# Patient Record
Sex: FEMALE | Race: OTHER | HISPANIC OR LATINO | URBAN - METROPOLITAN AREA
[De-identification: names, ages, dates, MRNs, and addresses within clinical notes are randomized per-mention and may not be internally consistent; named-entity substitution may affect disease eponyms.]

---

## 2020-08-20 ENCOUNTER — EMERGENCY (EMERGENCY)
Facility: HOSPITAL | Age: 55
LOS: 0 days | Discharge: HOME | End: 2020-08-20
Attending: EMERGENCY MEDICINE | Admitting: EMERGENCY MEDICINE
Payer: COMMERCIAL

## 2020-08-20 VITALS
HEART RATE: 104 BPM | RESPIRATION RATE: 16 BRPM | SYSTOLIC BLOOD PRESSURE: 116 MMHG | TEMPERATURE: 98 F | DIASTOLIC BLOOD PRESSURE: 69 MMHG | OXYGEN SATURATION: 95 % | WEIGHT: 154.98 LBS

## 2020-08-20 VITALS — HEART RATE: 97 BPM
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
## 2020-08-20 DIAGNOSIS — M79.601 PAIN IN RIGHT ARM: ICD-10-CM
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
## 2020-08-20 DIAGNOSIS — S50.11XA CONTUSION OF RIGHT FOREARM, INITIAL ENCOUNTER: ICD-10-CM

## 2020-08-20 DIAGNOSIS — M25.561 PAIN IN RIGHT KNEE: ICD-10-CM

## 2020-08-20 DIAGNOSIS — Z23 ENCOUNTER FOR IMMUNIZATION: ICD-10-CM

## 2020-08-20 DIAGNOSIS — S60.211A CONTUSION OF RIGHT WRIST, INITIAL ENCOUNTER: ICD-10-CM

## 2020-08-20 PROCEDURE — 73060 X-RAY EXAM OF HUMERUS: CPT | Mod: 26,RT

## 2020-08-20 PROCEDURE — 73080 X-RAY EXAM OF ELBOW: CPT | Mod: 26,RT

## 2020-08-20 PROCEDURE — 99284 EMERGENCY DEPT VISIT MOD MDM: CPT

## 2020-08-20 PROCEDURE — 73562 X-RAY EXAM OF KNEE 3: CPT | Mod: 26,RT

## 2020-08-20 PROCEDURE — 73110 X-RAY EXAM OF WRIST: CPT | Mod: 26,RT

## 2020-08-20 PROCEDURE — 73030 X-RAY EXAM OF SHOULDER: CPT | Mod: 26,RT

## 2020-08-20 PROCEDURE — 73090 X-RAY EXAM OF FOREARM: CPT | Mod: 26,RT

## 2020-08-20 RX ORDER — TETANUS TOXOID, REDUCED DIPHTHERIA TOXOID AND ACELLULAR PERTUSSIS VACCINE, ADSORBED 5; 2.5; 8; 8; 2.5 [IU]/.5ML; [IU]/.5ML; UG/.5ML; UG/.5ML; UG/.5ML
0.5 SUSPENSION INTRAMUSCULAR ONCE
Refills: 0 | Status: COMPLETED | OUTPATIENT
Start: 2020-08-20 | End: 2020-08-20

## 2020-08-20 RX ORDER — IBUPROFEN 200 MG
600 TABLET ORAL ONCE
Refills: 0 | Status: COMPLETED | OUTPATIENT
Start: 2020-08-20 | End: 2020-08-20

## 2020-08-20 RX ORDER — METHOCARBAMOL 500 MG/1
1000 TABLET, FILM COATED ORAL ONCE
Refills: 0 | Status: COMPLETED | OUTPATIENT
Start: 2020-08-20 | End: 2020-08-20

## 2020-08-20 RX ADMIN — Medication 600 MILLIGRAM(S): at 23:35

## 2020-08-20 RX ADMIN — METHOCARBAMOL 1000 MILLIGRAM(S): 500 TABLET, FILM COATED ORAL at 23:46
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
## 2020-08-20 NOTE — ED PROVIDER NOTE - CARE PROVIDER_API CALL
Eric Wiley  ORTHOPAEDIC SURGERY  [address] [place] [address]  Phone: [phone]  Fax: [phone]  Follow Up Time: 1-3 Days
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
## 2020-08-20 NOTE — ED PROVIDER NOTE - PATIENT PORTAL LINK FT
You can access the FollowMyHealth Patient Portal offered by [facility] by registering at the following website: [email or web address]. By joining [employer]’s FollowMyHealth portal, you will also be able to view your health information using other applications (apps) compatible with our system.
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
## 2020-08-21 RX ADMIN — Medication 600 MILLIGRAM(S): at 00:08

## 2020-08-21 RX ADMIN — TETANUS TOXOID, REDUCED DIPHTHERIA TOXOID AND ACELLULAR PERTUSSIS VACCINE, ADSORBED 0.5 MILLILITER(S): 5; 2.5; 8; 8; 2.5 SUSPENSION INTRAMUSCULAR at 00:05

## 2020-08-21 NOTE — ED ADULT NURSE NOTE - CHPI ED NUR SYMPTOMS NEG
no crying/no loss of consciousness/no fussiness/no disorientation/no bruising/no dizziness/no headache/no laceration/no decreased eating/drinking/no difficulty bearing weight
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
## 2023-07-27 ENCOUNTER — EMERGENCY (EMERGENCY)
Facility: HOSPITAL | Age: 58
LOS: 0 days | Discharge: HOME | End: 2023-07-27
Attending: EMERGENCY MEDICINE
Payer: COMMERCIAL

## 2023-07-27 VITALS
SYSTOLIC BLOOD PRESSURE: 144 MMHG | OXYGEN SATURATION: 98 % | TEMPERATURE: 98 F | HEART RATE: 76 BPM | DIASTOLIC BLOOD PRESSURE: 70 MMHG | WEIGHT: 119.93 LBS | RESPIRATION RATE: 18 BRPM | HEIGHT: 59 IN

## 2023-07-27 DIAGNOSIS — K04.7 PERIAPICAL ABSCESS WITHOUT SINUS: ICD-10-CM

## 2023-07-27 DIAGNOSIS — K08.89 OTHER SPECIFIED DISORDERS OF TEETH AND SUPPORTING STRUCTURES: ICD-10-CM

## 2023-07-27 PROCEDURE — 99283 EMERGENCY DEPT VISIT LOW MDM: CPT | Mod: 25

## 2023-07-27 PROCEDURE — 64400 NJX AA&/STRD TRIGEMINAL NRV: CPT

## 2023-07-27 PROCEDURE — 64400 NJX AA&/STRD TRIGEMINAL NRV: CPT | Mod: RT

## 2023-07-27 PROCEDURE — 99282 EMERGENCY DEPT VISIT SF MDM: CPT | Mod: 25

## 2023-07-27 NOTE — ED PROCEDURE NOTE - CPROC ED ANATOMIC LOCATION1
posterolateral portion of the maxillary tuberosity and 2nd molar anterior border of mandible ramus, intraoral coronoid notch, and occlusal plane of molars

## 2023-07-27 NOTE — ED PROVIDER NOTE - ATTENDING CONTRIBUTION TO CARE
Presenting with right molar pain and associated abscess, has been prescribed first antibiotic, with second antibiotic added, Nontoxic appearance,  No submandibular or floor or mouth swelling, voice change, odynophagia or drooling, fever, chills, or inhaled tooth fragment.   A/P: Likely caries and dental infection. and inferior alveolar block performed.  Referred to oral surgeon.

## 2023-07-27 NOTE — ED PROVIDER NOTE - NSFOLLOWUPCLINICS_GEN_ALL_ED_FT
[facility]  Dental  [address] [place] [id number]  Phone: [phone]  Fax:

## 2023-07-27 NOTE — ED PROVIDER NOTE - OBJECTIVE STATEMENT
57-year-old female no past medical history complains of right dental pain.  Patient noticed pain 5 days ago was prescribed Augmentin by her daughter has been compliant with medications for 4 days patient went to go see her dentist who prescribed clindamycin.  Patient was then advised to go see a an oral surgeon.  Patient presents today for increased pain tolerating p.o. no difficulty breathing no other complaints denies constitutional symptoms 57-year-old female no past medical history complains of right dental pain.  Patient noticed pain 5 days ago was prescribed Augmentin by her daughter has been compliant with medications for 4 days patient went to go see her dentist who prescribed clindamycin.  Patient was then advised to go see a an oral surgeon.  Patient presents today for increased pain tolerating p.o. no difficulty breathing no other complaints denies constitutional symptoms     - history corroborated by her Son at bedside.

## 2023-07-27 NOTE — ED ADULT TRIAGE NOTE - PATIENT ON (OXYGEN DELIVERY METHOD)
SUBJECTIVE: Anna Smith is a 55 year old female presenting with a chief complaint of sore throat and sob.  Onset of symptoms was day(s) ago.  Course of illness is worsening.    Severity moderate  Current and Associated symptoms: stuffy nose and cough - non-productive  Treatment measures tried include Tylenol/Ibuprofen.  Predisposing factors include None.    No past medical history on file.    No past surgical history on file.    No family history on file.    Social History   Substance Use Topics     Smoking status: Never Smoker     Smokeless tobacco: Never Used     Alcohol use No     ROS:  SKIN: no rash  GI: no vomiting    OBJECTIVE:  /88  Pulse 82  Temp 99.6  F (37.6  C) (Oral)  Resp 16  SpO2 94%GENERAL APPEARANCE: healthy, alert and no distress  EYES: EOMI,  PERRL, conjunctiva clear  HENT: ear canals and TM's normal.  Nose and mouth without ulcers, erythema or lesions  NECK: supple, nontender, no lymphadenopathy  RESP: lungs clear to auscultation - no rales, rhonchi or wheezes  CV: regular rates and rhythm, normal S1 S2, no murmur noted  SKIN: no suspicious lesions or rashes      ICD-10-CM    1. SOB (shortness of breath) R06.02 albuterol (2.5 MG/3ML) 0.083% neb solution     INHALATION/NEBULIZER TREATMENT, INITIAL     albuterol (PROAIR HFA) 108 (90 Base) MCG/ACT inhaler     ALBUTEROL UNIT DOSE, 1 MG   2. Throat pain R07.0 Strep, Rapid Screen     Beta strep group A culture     ALBUTEROL UNIT DOSE, 1 MG     Fluids/Rest, f/u if worse/not any better     room air

## 2023-07-27 NOTE — ED PROVIDER NOTE - PHYSICAL EXAMINATION
CONSTITUTIONAL: nontoxic appearing, in no acute distress  HEAD:  normocephalic, atraumatic  EYES:  no conjunctival injection, no eye discharge, tracking well  ENT: mmm; L inferior dental absesss  NECK:  supple, no masses, no tender anterior/posterior cervical lymphadenopathy  CV:  regular rate and rhythm, cap refill < 2 seconds  RESP:  normal respiratory effort  ABD:  soft, nontender, nondistended, no masses, no organomegaly  LYMPH:  no significant lymphadenopathy  MSK/NEURO:  normal movement, normal tone  SKIN:  warm, dry, no rash

## 2023-07-27 NOTE — ED PROVIDER NOTE - CLINICAL SUMMARY MEDICAL DECISION MAKING FREE TEXT BOX
Likely caries and dental infection. and inferior alveolar block performed.  Referred to oral surgeon.

## 2023-07-27 NOTE — ED PROCEDURE NOTE - CPROC ED POST PROC CARE GUIDE1
Verbal/written post procedure instructions were given to patient/caregiver. Verbal/written post procedure instructions were given to patient/caregiver./Instructed patient/caregiver regarding signs and symptoms of infection./Instructed patient/caregiver to follow-up with primary dentist./Avoid solid food./Avoid hot food.

## 2023-07-27 NOTE — ED PROVIDER NOTE - NSFOLLOWUPINSTRUCTIONS_ED_ALL_ED_FT
Continue to take your augmentin and clindamycin until the course is over.     DISCHARGE INSTRUCTIONS  - Please follow up with your doctor in 1-3 days  - Return to ED if you notice worsening vomiting, develop diarrhea, develop fevers, signs of respiratory distress, decreased oral intake, decreased wet diapers or any other concerning symptoms    Dental Pain    Dental pain (toothache) may be caused by many things including tooth decay (cavities or caries), abscess or infection, or trauma. If you were prescribed an antibiotic medicine, finish all of it even if you start to feel better. Rinsing your mouth with salt water or applying ice to the painful area of your face may help with the pain. Follow up with a dentist is important in ensuring good oral health and preventing the worsening of dental disease.    SEEK IMMEDIATE MEDICAL CARE IF YOU HAVE ANY OF THE FOLLOWING SYMPTOMS: unable to open your mouth, trouble breathing or swallowing, fever, or swelling of the face, neck, or jaw.    Abscess    An abscess is an infected area that contains a collection of pus and debris. It can occur in almost any part of the body and occurs when the tissue gets infection. Symptoms include a painful mass that is red, warm, tender that might break open and HAVE drainage. If your health care provider gave you antibiotics make sure to take the full course and do not stop even if feeling better.     SEEK IMMEDIATE MEDICAL CARE IF YOU HAVE ANY OF THE FOLLOWING SYMPTOMS: chills, fever, muscle aches, or red streaking from the area.

## 2023-07-27 NOTE — ED PROVIDER NOTE - PATIENT PORTAL LINK FT
You can access the FollowMyHealth Patient Portal offered by [facility] by registering at the following website: [email or web address]. By joining [employer]’s FollowMyHealth portal, you will also be able to view your health information using other applications (apps) compatible with our system.

## 2024-09-12 ENCOUNTER — NON-APPOINTMENT (OUTPATIENT)
Age: 59
End: 2024-09-12

## 2024-09-13 ENCOUNTER — APPOINTMENT (OUTPATIENT)
Dept: ORTHOPEDIC SURGERY | Facility: CLINIC | Age: 59
End: 2024-09-13
Payer: COMMERCIAL

## 2024-09-13 VITALS — WEIGHT: 110 LBS | HEIGHT: 58 IN | BODY MASS INDEX: 23.09 KG/M2

## 2024-09-13 DIAGNOSIS — S92.501A DISPLACED UNSPECIFIED FRACTURE OF RIGHT LESSER TOE(S), INITIAL ENCOUNTER FOR CLOSED FRACTURE: ICD-10-CM

## 2024-09-13 DIAGNOSIS — S92.351A DISPLACED FRACTURE OF FIFTH METATARSAL BONE, RIGHT FOOT, INITIAL ENCOUNTER FOR CLOSED FRACTURE: ICD-10-CM

## 2024-09-13 PROBLEM — Z00.00 ENCOUNTER FOR PREVENTIVE HEALTH EXAMINATION: Status: ACTIVE | Noted: 2024-09-13

## 2024-09-13 PROCEDURE — 99203 OFFICE O/P NEW LOW 30 MIN: CPT

## 2024-09-13 PROCEDURE — 73630 X-RAY EXAM OF FOOT: CPT | Mod: RT

## 2024-09-13 PROCEDURE — 73610 X-RAY EXAM OF ANKLE: CPT | Mod: RT

## 2024-09-13 RX ORDER — LOSARTAN POTASSIUM 100 MG/1
TABLET, FILM COATED ORAL
Refills: 0 | Status: ACTIVE | COMMUNITY

## 2024-09-13 NOTE — HISTORY OF PRESENT ILLNESS
[de-identified] : Patient is a 58-year-old female here for evaluation of right foot/ankle.  Patient states on 9/5/2024 she had tripped and fell and twisted her right foot/ankle.  Patient states that she was immediate pain, was seen in the emergency room and [facility].  Patient was told that she had a pinky toe fracture.  Patient was placed in tall cam walker boot weightbearing as tolerated told to follow with orthopedics.  Seen by patient walking, cam walker boot is too large for patient.  Right foot/ankle exam: Mild lateral ankle/foot effusion with no ecchymosis, no erythema tenderness palpation to proximal phalanx of fifth toe, tenderness palpation to fifth metatarsal base, tenderness palpation to lateral ligaments, good range of motion of foot/ankle with discomfort, no gross instability, neurovascular intact  X-ray right foot: Acute minimally displaced avulsion fracture off the base of the fifth metatarsal.  Acute nondisplaced fracture of proximal phalanx fifth toe  Discussed with patient detail that these fractures generally heal on their own conservatively.  Patient was better fitted for a cam walker boot weightbearing as tolerated.  Advised ice/heat, warm water Epsom salt soaks, Motrin/Tylenol as needed for pain.  Patient states that she is unable to walk with cam walker boot.  Patient will follow-up in 2 weeks with foot and ankle department for reevaluation.  Patient understands agrees with plan.  Call if symptoms worsen or change.

## 2024-09-23 ENCOUNTER — OUTPATIENT (OUTPATIENT)
Dept: OUTPATIENT SERVICES | Facility: HOSPITAL | Age: 59
LOS: 1 days | End: 2024-09-23
Payer: COMMERCIAL

## 2024-09-23 DIAGNOSIS — Z12.31 ENCOUNTER FOR SCREENING MAMMOGRAM FOR MALIGNANT NEOPLASM OF BREAST: ICD-10-CM

## 2024-09-23 PROCEDURE — 77067 SCR MAMMO BI INCL CAD: CPT

## 2024-09-23 PROCEDURE — 77063 BREAST TOMOSYNTHESIS BI: CPT

## 2024-09-23 PROCEDURE — 77063 BREAST TOMOSYNTHESIS BI: CPT | Mod: 26

## 2024-09-23 PROCEDURE — 77067 SCR MAMMO BI INCL CAD: CPT | Mod: 26

## 2024-09-24 DIAGNOSIS — Z12.31 ENCOUNTER FOR SCREENING MAMMOGRAM FOR MALIGNANT NEOPLASM OF BREAST: ICD-10-CM

## 2024-09-27 ENCOUNTER — OUTPATIENT (OUTPATIENT)
Dept: OUTPATIENT SERVICES | Facility: HOSPITAL | Age: 59
LOS: 1 days | End: 2024-09-27
Payer: COMMERCIAL

## 2024-09-27 DIAGNOSIS — R92.8 OTHER ABNORMAL AND INCONCLUSIVE FINDINGS ON DIAGNOSTIC IMAGING OF BREAST: ICD-10-CM

## 2024-09-27 PROCEDURE — 77065 DX MAMMO INCL CAD UNI: CPT | Mod: 26,LT

## 2024-09-27 PROCEDURE — 77065 DX MAMMO INCL CAD UNI: CPT | Mod: LT

## 2024-09-27 PROCEDURE — 77061 BREAST TOMOSYNTHESIS UNI: CPT | Mod: 26

## 2024-09-27 PROCEDURE — G0279: CPT

## 2024-09-28 DIAGNOSIS — R92.8 OTHER ABNORMAL AND INCONCLUSIVE FINDINGS ON DIAGNOSTIC IMAGING OF BREAST: ICD-10-CM

## 2024-10-02 ENCOUNTER — APPOINTMENT (OUTPATIENT)
Dept: ORTHOPEDIC SURGERY | Facility: CLINIC | Age: 59
End: 2024-10-02
Payer: COMMERCIAL

## 2024-10-02 ENCOUNTER — NON-APPOINTMENT (OUTPATIENT)
Age: 59
End: 2024-10-02

## 2024-10-02 DIAGNOSIS — S92.351A DISPLACED FRACTURE OF FIFTH METATARSAL BONE, RIGHT FOOT, INITIAL ENCOUNTER FOR CLOSED FRACTURE: ICD-10-CM

## 2024-10-02 PROCEDURE — 28470 CLTX METATARSAL FX WO MNP EA: CPT | Mod: RT

## 2024-10-02 PROCEDURE — 73630 X-RAY EXAM OF FOOT: CPT | Mod: RT

## 2024-10-02 PROCEDURE — 99204 OFFICE O/P NEW MOD 45 MIN: CPT | Mod: 57

## 2024-10-02 NOTE — DISCUSSION/SUMMARY
[de-identified] :   Pt presenting for follow up of their fifth metatarsal fracture which occurred a couple weeks ago. They were seen at the urgent care and referred to me for definitive management. Has been WBAT in assistive device. No pain other than at the base of the fifth metatarsal. Does not endorse calf pain, chest pain, SOB.     Exam:       Alert/oriented x3.   Mildly TTP at fifth metatarsal base   No fx instability noted.   No malalignment noted.   Compartments/soft compressible   ROM grossly intact   Neuro intact distally.       Imaging:       3 views of patients foot ordered and reviewed by me personally   They demonstrate evidence of a nondisplaced fx of the fifth metatarsal base without interval displacement.       Plan:       Continue WBAT in boot/postop shoe. In three weeks, can transition to sneaker if pain allows. We can treat this injury nonsurgically and initiate fracture care. I will see patient back in 4 weeks for reassessment.

## 2024-11-04 ENCOUNTER — APPOINTMENT (OUTPATIENT)
Facility: CLINIC | Age: 59
End: 2024-11-04

## 2024-11-04 DIAGNOSIS — S92.351A DISPLACED FRACTURE OF FIFTH METATARSAL BONE, RIGHT FOOT, INITIAL ENCOUNTER FOR CLOSED FRACTURE: ICD-10-CM

## 2025-03-18 ENCOUNTER — NON-APPOINTMENT (OUTPATIENT)
Age: 60
End: 2025-03-18